# Patient Record
Sex: MALE | Race: WHITE | NOT HISPANIC OR LATINO | Employment: FULL TIME | URBAN - METROPOLITAN AREA
[De-identification: names, ages, dates, MRNs, and addresses within clinical notes are randomized per-mention and may not be internally consistent; named-entity substitution may affect disease eponyms.]

---

## 2018-10-19 ENCOUNTER — HISTORICAL (OUTPATIENT)
Dept: ADMINISTRATIVE | Facility: HOSPITAL | Age: 28
End: 2018-10-19

## 2018-10-19 LAB
ABS NEUT (OLG): 3.5
ALBUMIN SERPL-MCNC: 4.6 GM/DL (ref 3.4–5)
ALBUMIN/GLOB SERPL: 2 {RATIO} (ref 1.5–2.5)
ALP SERPL-CCNC: 54 UNIT/L (ref 38–126)
ALT SERPL-CCNC: 13 UNIT/L (ref 7–52)
AST SERPL-CCNC: 12 UNIT/L (ref 15–37)
BILIRUB SERPL-MCNC: 0.6 MG/DL (ref 0.2–1)
BILIRUBIN DIRECT+TOT PNL SERPL-MCNC: 0.1 MG/DL (ref 0–0.5)
BILIRUBIN DIRECT+TOT PNL SERPL-MCNC: 0.5 MG/DL
BUN SERPL-MCNC: 11 MG/DL (ref 7–18)
CALCIUM SERPL-MCNC: 9 MG/DL (ref 8.5–10)
CHLORIDE SERPL-SCNC: 104 MMOL/L (ref 98–107)
CHOLEST SERPL-MCNC: 127 MG/DL (ref 0–200)
CHOLEST/HDLC SERPL: 4.1 {RATIO}
CO2 SERPL-SCNC: 30 MMOL/L (ref 21–32)
CREAT SERPL-MCNC: 1.04 MG/DL (ref 0.6–1.3)
ERYTHROCYTE [DISTWIDTH] IN BLOOD BY AUTOMATED COUNT: 13.3 % (ref 11.5–17)
GLOBULIN SER-MCNC: 2.2 GM/DL (ref 1.2–3)
GLUCOSE SERPL-MCNC: 85 MG/DL (ref 74–106)
HCT VFR BLD AUTO: 49.6 % (ref 42–52)
HDLC SERPL-MCNC: 31 MG/DL (ref 35–60)
HGB BLD-MCNC: 16 GM/DL (ref 14–18)
LDLC SERPL CALC-MCNC: 82 MG/DL (ref 0–129)
LYMPHOCYTES # BLD AUTO: 2.7 X10(3)/MCL (ref 0.6–3.4)
LYMPHOCYTES NFR BLD AUTO: 39.1 % (ref 13–40)
MCH RBC QN AUTO: 30.1 PG (ref 27–31.2)
MCHC RBC AUTO-ENTMCNC: 32 GM/DL (ref 32–36)
MCV RBC AUTO: 93 FL (ref 80–94)
MONOCYTES # BLD AUTO: 0.7 X10(3)/MCL (ref 0–1.8)
MONOCYTES NFR BLD AUTO: 9.9 % (ref 0.1–24)
NEUTROPHILS NFR BLD AUTO: 51 % (ref 47–80)
PLATELET # BLD AUTO: 202 X10(3)/MCL (ref 130–400)
PMV BLD AUTO: 10 FL
POTASSIUM SERPL-SCNC: 4.3 MMOL/L (ref 3.5–5.1)
PROT SERPL-MCNC: 6.9 GM/DL (ref 6.4–8.2)
RBC # BLD AUTO: 5.32 X10(6)/MCL (ref 4.7–6.1)
SODIUM SERPL-SCNC: 139 MMOL/L (ref 136–145)
TRIGL SERPL-MCNC: 37 MG/DL (ref 30–150)
TSH SERPL-ACNC: 0.6 MIU/ML (ref 0.35–4.94)
VLDLC SERPL CALC-MCNC: 7.4 MG/DL
WBC # SPEC AUTO: 6.9 X10(3)/MCL (ref 4.5–11.5)

## 2021-02-22 ENCOUNTER — HISTORICAL (OUTPATIENT)
Dept: ADMINISTRATIVE | Facility: HOSPITAL | Age: 31
End: 2021-02-22

## 2021-02-22 LAB
ABS NEUT (OLG): 4.1 X10(3)/MCL (ref 2.1–9.2)
ALBUMIN SERPL-MCNC: 4.6 GM/DL (ref 3.4–5)
ALBUMIN/GLOB SERPL: 2.3 {RATIO} (ref 1.5–2.5)
ALP SERPL-CCNC: 54 UNIT/L (ref 38–126)
ALT SERPL-CCNC: 13 UNIT/L (ref 7–52)
AST SERPL-CCNC: 15 UNIT/L (ref 15–37)
BILIRUB SERPL-MCNC: 0.3 MG/DL (ref 0.2–1)
BILIRUBIN DIRECT+TOT PNL SERPL-MCNC: 0.1 MG/DL (ref 0–0.5)
BILIRUBIN DIRECT+TOT PNL SERPL-MCNC: 0.2 MG/DL
BUN SERPL-MCNC: 10 MG/DL (ref 7–18)
CALCIUM SERPL-MCNC: 9 MG/DL (ref 8.5–10.1)
CHLORIDE SERPL-SCNC: 105 MMOL/L (ref 98–107)
CHOLEST SERPL-MCNC: 139 MG/DL (ref 0–200)
CHOLEST/HDLC SERPL: 5.1 {RATIO}
CO2 SERPL-SCNC: 26 MMOL/L (ref 21–32)
CREAT SERPL-MCNC: 1.12 MG/DL (ref 0.6–1.3)
ERYTHROCYTE [DISTWIDTH] IN BLOOD BY AUTOMATED COUNT: 13 % (ref 11.5–17)
GLOBULIN SER-MCNC: 2 GM/DL (ref 1.2–3)
GLUCOSE SERPL-MCNC: 90 MG/DL (ref 74–106)
HCT VFR BLD AUTO: 42.5 % (ref 42–52)
HDLC SERPL-MCNC: 27 MG/DL (ref 35–60)
HGB BLD-MCNC: 14.4 GM/DL (ref 14–18)
LDLC SERPL CALC-MCNC: 93 MG/DL (ref 0–129)
LYMPHOCYTES # BLD AUTO: 3.3 X10(3)/MCL (ref 0.6–3.4)
LYMPHOCYTES NFR BLD AUTO: 41.2 % (ref 13–40)
MCH RBC QN AUTO: 29.7 PG (ref 27–31.2)
MCHC RBC AUTO-ENTMCNC: 34 GM/DL (ref 32–36)
MCV RBC AUTO: 88 FL (ref 80–94)
MONOCYTES # BLD AUTO: 0.6 X10(3)/MCL (ref 0.1–1.3)
MONOCYTES NFR BLD AUTO: 7.1 % (ref 0.1–24)
NEUTROPHILS NFR BLD AUTO: 51.7 % (ref 47–80)
PLATELET # BLD AUTO: 220 X10(3)/MCL (ref 130–400)
PMV BLD AUTO: 10.4 FL (ref 9.4–12.4)
POTASSIUM SERPL-SCNC: 4.2 MMOL/L (ref 3.5–5.1)
PROT SERPL-MCNC: 6.6 GM/DL (ref 6.4–8.2)
RBC # BLD AUTO: 4.85 X10(6)/MCL (ref 4.7–6.1)
SODIUM SERPL-SCNC: 141 MMOL/L (ref 136–145)
TRIGL SERPL-MCNC: 231 MG/DL (ref 30–150)
TSH SERPL-ACNC: 0.69 MIU/ML (ref 0.35–4.94)
VLDLC SERPL CALC-MCNC: 46.2 MG/DL
WBC # SPEC AUTO: 8 X10(3)/MCL (ref 4.5–11.5)

## 2022-04-10 ENCOUNTER — HISTORICAL (OUTPATIENT)
Dept: ADMINISTRATIVE | Facility: HOSPITAL | Age: 32
End: 2022-04-10

## 2022-04-25 VITALS
HEIGHT: 67 IN | DIASTOLIC BLOOD PRESSURE: 76 MMHG | BODY MASS INDEX: 35.47 KG/M2 | WEIGHT: 226 LBS | SYSTOLIC BLOOD PRESSURE: 122 MMHG

## 2022-05-03 NOTE — HISTORICAL [facility] CERNER
This is a historical note converted from [employer]. Formatting and pictures may have been removed.  Please reference [employer] for original formatting and attached multimedia. Chief Complaint  WELLNESS CPX, RECHECK MEDS  History of Present Illness  28 year old WM presents for annual wellness  PMH: ADHD  ?  , 2 kids, Works in [employer]  No Tob, EtOH: rare  No exercise  ?  Denies any complaints  Review of Systems  Constitutional:?no fever, fatigue, weakness  Eye:?no vision loss, eye redness, drainage, or pain  ENMT:?no sore throat, ear pain, sinus pain/congestion, nasal congestion/drainage  Respiratory:?no cough, no wheezing, no shortness of breath  Cardiovascular:?no chest pain, no palpitations, no edema  Gastrointestinal:?no nausea, vomiting, or diarrhea. No abdominal pain  Genitourinary:?no dysuria, no urinary frequency or urgency, no hematuria  Hema/Lymph:?no abnormal bruising or bleeding  Endocrine:?no heat or cold intolerance, no excessive thirst or excessive urination  Musculoskeletal:?no muscle or joint pain, no joint swelling  Integumentary:?no skin rash or abnormal lesion  Neurologic: no headache, no dizziness, no weakness or numbness  ?  Physical Exam  Vitals & Measurements  T:?37.1? ?C (Oral)? HR:?56(Peripheral)? BP:?118/74?  HT:?172?cm? HT:?172?cm? WT:?91.5?kg? WT:?91.5?kg? BMI:?30.93?  General:?well-developed well-nourished in no acute distress  Eye: PERRLA, EOMI, clear conjunctiva, eyelids normal  HENT:?TMs/ear canals clear, oropharynx without erythema/exudate, oropharynx and nasal mucosal surfaces moist, no maxillary/frontal sinus tenderness to palpation  Neck: full range of motion, no thyromegaly or lymphadenopathy  Respiratory:?clear to auscultation bilaterally  Cardiovascular:?regular rate and rhythm without murmurs, gallops or rubs  Gastrointestinal:?soft, non-tender, non-distended with normal bowel sounds, without masses to palpation  Genitourinary: no CVA tenderness to  palpation  Musculoskeletal:?full range of motion of all extremities/spine without limitation or discomfort  Integumentary: no rashes or skin lesions present  Neurologic: cranial nerves intact, no signs of peripheral neurological deficit, motor/sensory function intact  ?  Assessment/Plan  1.?Wellness examination  ?LABS: CBC, CMP, TSH, FLP  ?  2.?Adult ADHD  Continue Vyvanse 50mg PO q day (30, NR)  Continue Adderall?20mg PO q afternoon (30, NR)  ??????? rx x 3  Ordered:  Clinic Follow up, *Est. 01/19/19 3:00:00 CST, Order for future visit, Adult ADHD, [facility]  Office/Outpatient Visit Level 3 Established [id number] PC, Adult ADHD, [facility], 10/19/18 10:25:00 CDT  ?  Orders:  amphetamine-dextroamphetamine, 20 mg = 1 tab(s), Oral, qAM, # 30 tab(s), 0 Refill(s), Earliest Fill Date 11/19/18  amphetamine-dextroamphetamine, 20 mg = 1 tab(s), Oral, qAM, # 30 tab(s), 0 Refill(s)  amphetamine-dextroamphetamine, 20 mg = 1 tab(s), Oral, qAM, # 30 tab(s), 0 Refill(s), Earliest Fill Date 12/19/18  lisdexamfetamine, 50 mg = 1 cap(s), Oral, Daily, # 30 cap(s), 0 Refill(s)  lisdexamfetamine, 50 mg = 1 cap(s), Oral, Daily, # 30 cap(s), 0 Refill(s), Earliest Fill Date 11/19/18  lisdexamfetamine, 50 mg = 1 cap(s), Oral, Daily, # 30 cap(s), 0 Refill(s), Earliest Fill Date 12/19/18   Problem List/Past Medical History  Ongoing  Adult ADHD  Obesity  Tobacco user  Historical  No qualifying data  Procedure/Surgical History  Eustachian tuboplasty  Tonsillectomy and adenoidectomy   Medications  Adderall 20 mg oral tablet, 20 mg= 1 tab(s), Oral, qAM  Adderall 20 mg oral tablet, 20 mg= 1 tab(s), Oral, qAM  Adderall 20 mg oral tablet, 20 mg= 1 tab(s), Oral, qAM  Adderall 20 mg oral tablet, 20 mg= 1 tab(s), Oral, qAM,? ?Investigating  Adderall 20 mg oral tablet, 20 mg= 1 tab(s), Oral, qAM  Adderall 20 mg oral tablet, 20 mg= 1 tab(s), Oral, qAM  Adderall 20 mg oral tablet, 20 mg= 1 tab(s), Oral, qAM  Adderall 20 mg oral tablet, 20 mg= 1 tab(s),  Oral, qAM,? ?Investigating  amphetamine-dextroamphetamine 20 mg oral tablet, 20 mg= 1 tab(s), Oral, Daily  Norco 5 mg-325 mg oral tablet, 1 tab(s), Oral, q6hr, PRN,? ?Investigating  Vyvanse 50 mg oral capsule, 50 mg= 1 cap(s), Oral, Daily  Vyvanse 50 mg oral capsule, 50 mg= 1 cap(s), Oral, Daily  Vyvanse 50 mg oral capsule, 50 mg= 1 cap(s), Oral, Daily,? ?Investigating  Vyvanse 50 mg oral capsule, 50 mg= 1 cap(s), Oral, Daily,? ?Investigating  Vyvanse 50 mg oral capsule, 50 mg= 1 cap(s), Oral, qAM  Vyvanse 50 mg oral capsule, 50 mg= 1 cap(s), Oral, Daily  Vyvanse 50 mg oral capsule, 50 mg= 1 cap(s), Oral, Daily  Vyvanse 50 mg oral capsule, 50 mg= 1 cap(s), Oral, Daily  Allergies  No Known Allergies  Social History  Alcohol - Denies Alcohol Use, 10/03/2012  Current, Beer, 1-2 times per year, 03/19/2018  Employment/School  Employed, 03/19/2018  Home/Environment  Lives with Children, Spouse., 03/19/2018  Nutrition/Health  Regular, 03/19/2018  Substance Abuse  Past, 03/19/2018  Tobacco - Denies Tobacco Use, 10/03/2012  Current every day smoker, Electronic Device, No, 10/19/2018  Family History  Acute myocardial infarction.: Grandfather.  Asthma.: Mother.  Emphysema: Mother.  Hypertension.: Father.  Immunizations  Vaccine Date Status   hepatitis B pediatric vaccine 07/15/2005 Recorded   hepatitis B pediatric vaccine 02/21/2005 Recorded   hepatitis B pediatric vaccine 11/01/2004 Recorded   tetanus-diphtheria toxoids 11/01/2004 Recorded   influenza virus vaccine, inactivated 10/15/2002 Recorded   measles/mumps/rubella virus vaccine 08/04/1993 Recorded   measles/mumps/rubella virus vaccine 09/11/1990 Recorded   Health Maintenance  Health Maintenance  ???Pending?(in the next year)  ??? ??OverDue  ??? ? ? ?Tetanus Vaccine due??11/01/14??and every 10??year(s)  ??? ??Due?  ??? ? ? ?ADL Screening due??10/19/18??and every 1??year(s)  ??? ? ? ?Alcohol Misuse Screening due??10/19/18??and every 1??year(s)  ??? ? ? ?Depression  Screening due??10/19/18??and every?  ??? ? ? ?Influenza Vaccine due??10/19/18??and every?  ??? ??Due In Future?  ??? ? ? ?Smoking Cessation not due until??07/18/19??and every 1??year(s)  ???Satisfied?(in the past 1 year)  ??? ??Satisfied?  ??? ? ? ?Blood Pressure Screening on??10/19/18.??Satisfied by Macie Boyle  ??? ? ? ?Body Mass Index Check on??10/19/18.??Satisfied by Macie Boyle  ??? ? ? ?Influenza Vaccine on??10/19/18.??Satisfied by Macie Boyle  ??? ? ? ?Obesity Screening on??10/19/18.??Satisfied by Macie Boyle  ??? ? ? ?Smoking Cessation on??07/18/18.??Satisfied by Macie Boyle  ?  ?

## 2022-08-06 ENCOUNTER — HOSPITAL ENCOUNTER (EMERGENCY)
Facility: HOSPITAL | Age: 32
Discharge: HOME | End: 2022-08-06
Attending: SPECIALIST
Payer: COMMERCIAL

## 2022-08-06 VITALS
SYSTOLIC BLOOD PRESSURE: 135 MMHG | BODY MASS INDEX: 39.24 KG/M2 | OXYGEN SATURATION: 98 % | DIASTOLIC BLOOD PRESSURE: 84 MMHG | WEIGHT: 250 LBS | HEIGHT: 67 IN | TEMPERATURE: 98 F | RESPIRATION RATE: 16 BRPM | HEART RATE: 79 BPM

## 2022-08-06 DIAGNOSIS — M70.21 OLECRANON BURSITIS OF RIGHT ELBOW: Primary | ICD-10-CM

## 2022-08-06 PROCEDURE — 99283 EMERGENCY DEPT VISIT LOW MDM: CPT

## 2022-08-06 PROCEDURE — 25000003 PHARM REV CODE 250: Performed by: SPECIALIST

## 2022-08-06 RX ORDER — KETOROLAC TROMETHAMINE 10 MG/1
10 TABLET, FILM COATED ORAL
Status: COMPLETED | OUTPATIENT
Start: 2022-08-06 | End: 2022-08-06

## 2022-08-06 RX ORDER — DICLOFENAC SODIUM 50 MG/1
50 TABLET, DELAYED RELEASE ORAL 3 TIMES DAILY PRN
Qty: 20 TABLET | Refills: 0 | Status: SHIPPED | OUTPATIENT
Start: 2022-08-06 | End: 2022-10-12

## 2022-08-06 RX ADMIN — KETOROLAC TROMETHAMINE 10 MG: 10 TABLET, FILM COATED ORAL at 10:08

## 2022-08-07 NOTE — ED PROVIDER NOTES
Encounter Date: 8/6/2022       History     Chief Complaint   Patient presents with    Joint Swelling     Pt complaints of right elbow pain and stiffness that started last night. Denies any previous problems     Right elbow pain since last night; he states his wife said it was swollen but he does not think so; no fever        Review of patient's allergies indicates:  No Known Allergies  No past medical history on file.  Past Surgical History:   Procedure Laterality Date    DILATION, EUSTACHIAN TUBE, USING BALLOON      TONSILLECTOMY AND ADENOIDECTOMY       Family History   Problem Relation Age of Onset    Asthma Mother     Emphysema Mother     Hypertension Father     Heart attack Maternal Grandfather      Social History     Tobacco Use    Smoking status: Smoker, Current Status Unknown   Substance Use Topics    Alcohol use: Not Currently    Drug use: Not Currently     Review of Systems   Constitutional: Negative.    Respiratory: Negative.    Cardiovascular: Negative.    Gastrointestinal: Negative.    Genitourinary: Negative.    Musculoskeletal: Negative.    Neurological: Negative.        Physical Exam     Initial Vitals [08/06/22 2150]   BP Pulse Resp Temp SpO2   135/84 79 16 98.4 °F (36.9 °C) 98 %      MAP       --         Physical Exam    Nursing note and vitals reviewed.  Constitutional: He appears well-developed and well-nourished.   HENT:   Head: Normocephalic and atraumatic.   Eyes: EOM are normal. Pupils are equal, round, and reactive to light.   Neck: Neck supple.   Normal range of motion.  Cardiovascular: Normal rate, regular rhythm and normal heart sounds.   Pulmonary/Chest: Breath sounds normal.   Abdominal: Abdomen is soft. Bowel sounds are normal.   Musculoskeletal:         General: Normal range of motion.      Cervical back: Normal range of motion and neck supple.      Comments: Point tenderness right olecranon, no erythema, no swelling, FROM     Neurological: He is alert and oriented to person,  place, and time.   Skin: Skin is warm and dry.         ED Course   Procedures  Labs Reviewed - No data to display       Imaging Results    None          Medications   ketorolac tablet 10 mg (10 mg Oral Given 8/6/22 2221)                          Clinical Impression:   Final diagnoses:  [M70.21] Olecranon bursitis of right elbow (Primary)          ED Disposition Condition    Discharge Stable        ED Prescriptions     Medication Sig Dispense Start Date End Date Auth. Provider    diclofenac (VOLTAREN) 50 MG EC tablet Take 1 tablet (50 mg total) by mouth 3 (three) times daily as needed (pain). 20 tablet 8/6/2022  Juan Golden MD        Follow-up Information     Follow up With Specialties Details Why Contact Info    Noah Christian MD Family Medicine In 1 week As needed [address] [facility] [address]  [phone]      [facility] Emergency Medicine  As needed [address] [facility] [address] [phone]           Juan Golden MD  08/07/22 0039

## 2022-08-17 PROBLEM — E66.9 OBESITY: Status: ACTIVE | Noted: 2022-08-17

## 2022-08-17 PROBLEM — Z78.9 ELECTRONIC CIGARETTE USE: Status: ACTIVE | Noted: 2022-08-17

## 2022-11-10 PROBLEM — E66.09 CLASS 2 OBESITY DUE TO EXCESS CALORIES WITHOUT SERIOUS COMORBIDITY WITH BODY MASS INDEX (BMI) OF 37.0 TO 37.9 IN ADULT: Status: ACTIVE | Noted: 2022-08-17

## 2022-12-12 PROBLEM — E66.1 CLASS 2 DRUG-INDUCED OBESITY WITH BODY MASS INDEX (BMI) OF 36.0 TO 36.9 IN ADULT: Status: ACTIVE | Noted: 2022-08-17

## 2022-12-12 PROBLEM — E66.09 CLASS 2 OBESITY DUE TO EXCESS CALORIES WITHOUT SERIOUS COMORBIDITY WITH BODY MASS INDEX (BMI) OF 37.0 TO 37.9 IN ADULT: Status: RESOLVED | Noted: 2022-08-17 | Resolved: 2022-12-12

## 2023-01-16 PROBLEM — E66.9 OBESITY (BMI 35.0-39.9 WITHOUT COMORBIDITY): Status: ACTIVE | Noted: 2023-01-16

## 2023-01-16 PROBLEM — E66.1 CLASS 2 DRUG-INDUCED OBESITY WITH BODY MASS INDEX (BMI) OF 36.0 TO 36.9 IN ADULT: Status: RESOLVED | Noted: 2022-08-17 | Resolved: 2023-01-16

## 2023-02-22 ENCOUNTER — HOSPITAL ENCOUNTER (EMERGENCY)
Facility: HOSPITAL | Age: 33
Discharge: HOME | End: 2023-02-22
Attending: STUDENT IN AN ORGANIZED HEALTH CARE EDUCATION/TRAINING PROGRAM
Payer: COMMERCIAL

## 2023-02-22 VITALS
DIASTOLIC BLOOD PRESSURE: 77 MMHG | RESPIRATION RATE: 18 BRPM | OXYGEN SATURATION: 96 % | WEIGHT: 230 LBS | BODY MASS INDEX: 34.86 KG/M2 | SYSTOLIC BLOOD PRESSURE: 127 MMHG | HEART RATE: 110 BPM | HEIGHT: 68 IN | TEMPERATURE: 98 F

## 2023-02-22 DIAGNOSIS — S05.01XA ABRASION OF RIGHT CORNEA, INITIAL ENCOUNTER: Primary | ICD-10-CM

## 2023-02-22 PROCEDURE — 25000003 PHARM REV CODE 250: Performed by: STUDENT IN AN ORGANIZED HEALTH CARE EDUCATION/TRAINING PROGRAM

## 2023-02-22 PROCEDURE — 99283 EMERGENCY DEPT VISIT LOW MDM: CPT

## 2023-02-22 RX ORDER — ERYTHROMYCIN 5 MG/G
OINTMENT OPHTHALMIC ONCE
Status: COMPLETED | OUTPATIENT
Start: 2023-02-22 | End: 2023-02-22

## 2023-02-22 RX ORDER — TETRACAINE HYDROCHLORIDE 5 MG/ML
2 SOLUTION OPHTHALMIC
Status: COMPLETED | OUTPATIENT
Start: 2023-02-22 | End: 2023-02-22

## 2023-02-22 RX ORDER — ERYTHROMYCIN 5 MG/G
OINTMENT OPHTHALMIC
Qty: 3.5 G | Refills: 0 | Status: SHIPPED | OUTPATIENT
Start: 2023-02-22 | End: 2023-02-24

## 2023-02-22 RX ADMIN — FLUORESCEIN SODIUM 1 EACH: 1 STRIP OPHTHALMIC at 04:02

## 2023-02-22 RX ADMIN — TETRACAINE HYDROCHLORIDE 2 DROP: 5 SOLUTION OPHTHALMIC at 03:02

## 2023-02-22 RX ADMIN — ERYTHROMYCIN 1 INCH: 5 OINTMENT OPHTHALMIC at 05:02

## 2023-02-22 NOTE — ED NOTES
Pt w bilat eye redness today - but states feels like something flew into rt eye today- also has been rubbing the eye he states because of the feeling like something is in it.  Reports he did flush it at home after he felt like something flew into it but it feels  worse now.

## 2023-02-22 NOTE — ED NOTES
"Dr leggett int o examine pt rt eye after 2 more gtts alcaine instilled per her for eye pain.  Stained w fluristrip- per dr leggett and  corrales lamp reveals corneal abrasion.  She flipped eyelid and assessed for  foreign body then irrigated pt eye sever times w  ns eye wash .  Pt reports feels like something is in his eye- corneal abrasion dx and s/s expalined to him per dr leggett.  Pt stated-  " I still feel like something is in my eye, I'm just gonna take a qtip when  I get home and  stick it in there and  check it myself"  pt advised  by dr leggett not to do  this- explained risks .   Educated again and  advised to see eye md tomorrow fro recheck and get rx eye ointment and apply as ordered.  "

## 2023-02-24 PROBLEM — E66.9 OBESITY (BMI 35.0-39.9 WITHOUT COMORBIDITY): Status: RESOLVED | Noted: 2023-01-16 | Resolved: 2023-02-24

## 2023-02-25 NOTE — ED PROVIDER NOTES
Encounter Date: 2/22/2023       History     Chief Complaint   Patient presents with    Foreign Body in Eye     Pt states he was working on something outside when something flew into right eye; happened 3hrs ago and pt states it has progressively gotten worse; vision is blurry and painful to keep eye open      32 M presents to ER, Pt states he was working on something outside when something flew into right eye; happened 3hrs ago and pt states it has progressively gotten worse; vision is blurry and painful to keep eye open       Review of patient's allergies indicates:  No Known Allergies  No past medical history on file.  Past Surgical History:   Procedure Laterality Date    DILATION, EUSTACHIAN TUBE, USING BALLOON      TONSILLECTOMY AND ADENOIDECTOMY       Family History   Problem Relation Age of Onset    Asthma Mother     Emphysema Mother     Hypertension Father     Heart attack Maternal Grandfather      Social History     Tobacco Use    Smoking status: Smoker, Current Status Unknown     Types: Vaping with nicotine    Smokeless tobacco: Never   Substance Use Topics    Alcohol use: Not Currently    Drug use: Not Currently     Review of Systems   Constitutional:  Negative for fever.   HENT:  Negative for sore throat.    Eyes:  Positive for photophobia, pain, redness and visual disturbance.   Respiratory:  Negative for shortness of breath.    Cardiovascular:  Negative for chest pain.   Gastrointestinal:  Negative for nausea.   Genitourinary:  Negative for dysuria.   Musculoskeletal:  Negative for back pain.   Skin:  Negative for rash.   Neurological:  Negative for weakness.   Hematological:  Does not bruise/bleed easily.     Physical Exam     Initial Vitals [02/22/23 1513]   BP Pulse Resp Temp SpO2   127/77 110 18 98.3 °F (36.8 °C) 96 %      MAP       --         Physical Exam    Constitutional: He appears well-developed and well-nourished.   HENT:   Head: Normocephalic.   Eyes: EOM are normal. Pupils are equal, round,  and reactive to light. Lids are everted and swept, no foreign bodies found. Right eye exhibits no chemosis, no discharge and no exudate. No foreign body present in the right eye. Right conjunctiva is injected.   Slit lamp exam:       The right eye shows corneal abrasion and fluorescein uptake.   Neck:   Normal range of motion.  Cardiovascular:  Normal rate, regular rhythm and normal pulses.           Pulmonary/Chest: Breath sounds normal. No respiratory distress.   Abdominal: Abdomen is soft. Bowel sounds are normal. There is no abdominal tenderness.   Musculoskeletal:         General: Normal range of motion.      Cervical back: Normal range of motion.     Neurological: He is alert.   Skin: Skin is warm. Capillary refill takes less than 2 seconds.   Psychiatric: He has a normal mood and affect.       ED Course   Procedures  Labs Reviewed - No data to display       Imaging Results    None          Medications   TETRAcaine HCl (PF) 0.5 % Drop 2 drop (2 drops Left Eye Given 23 1520)   fluorescein ophthalmic strip 1 each (1 each Left Eye Given 23 1615)   erythromycin 5 mg/gram (0.5 %) ophthalmic ointment (1 inch Right Eye Given 23 1722)     Medical Decision Making:   Differential Diagnosis:   Corneal abrasion, foreign body, other ocular pathology  ED Management:  Corneal abrasion visualized fluorescein and Wood's lamp on exam.    Erythromycin ointment applied and prescribed, patient given info to follow with Ophthalmology on tomorrow.  May take ibuprofen p.r.n. pain.  Ear precautions reviewed, all questions concerns addressed to the patient's satisfaction.                        Clinical Impression:   Final diagnoses:  [S05.01XA] Abrasion of right cornea, initial encounter (Primary)        ED Disposition Condition    Discharge Stable          ED Prescriptions       Medication Sig Dispense Start Date End Date Auth. Provider    erythromycin (ROMYCIN) ophthalmic ointment () Place a 1/2 inch ribbon of  ointment into the lower eyelid  QID 3.5 g 2/22/2023 2/24/2023 Sandy Pascual MD          Follow-up Information       Follow up With Specialties Details Why Contact Info    Pj Ballard, [facility] Op[facility] Schedule an appointment as soon as possible for a visit on 2/23/2023  [address] [facility] [address]  [phone]               Sandy Pascual MD  02/25/23 0806

## 2023-07-23 ENCOUNTER — HOSPITAL ENCOUNTER (EMERGENCY)
Facility: HOSPITAL | Age: 33
Discharge: HOME | End: 2023-07-23
Attending: SPECIALIST
Payer: COMMERCIAL

## 2023-07-23 VITALS
HEART RATE: 96 BPM | RESPIRATION RATE: 20 BRPM | DIASTOLIC BLOOD PRESSURE: 72 MMHG | SYSTOLIC BLOOD PRESSURE: 136 MMHG | OXYGEN SATURATION: 99 % | TEMPERATURE: 98 F

## 2023-07-23 DIAGNOSIS — M25.562 LEFT KNEE PAIN: ICD-10-CM

## 2023-07-23 DIAGNOSIS — S93.402A SPRAIN OF LEFT ANKLE, UNSPECIFIED LIGAMENT, INITIAL ENCOUNTER: ICD-10-CM

## 2023-07-23 DIAGNOSIS — M25.572 LEFT ANKLE PAIN: ICD-10-CM

## 2023-07-23 DIAGNOSIS — S83.92XA SPRAIN OF LEFT KNEE, UNSPECIFIED LIGAMENT, INITIAL ENCOUNTER: Primary | ICD-10-CM

## 2023-07-23 PROCEDURE — 99284 EMERGENCY DEPT VISIT MOD MDM: CPT

## 2023-07-23 PROCEDURE — 96372 THER/PROPH/DIAG INJ SC/IM: CPT | Performed by: SPECIALIST

## 2023-07-23 PROCEDURE — 63600175 PHARM REV CODE 636 W HCPCS: Performed by: SPECIALIST

## 2023-07-23 RX ORDER — DICLOFENAC SODIUM 50 MG/1
50 TABLET, DELAYED RELEASE ORAL 3 TIMES DAILY PRN
Qty: 30 TABLET | Refills: 0 | Status: SHIPPED | OUTPATIENT
Start: 2023-07-23 | End: 2024-02-28

## 2023-07-23 RX ORDER — KETOROLAC TROMETHAMINE 30 MG/ML
60 INJECTION, SOLUTION INTRAMUSCULAR; INTRAVENOUS
Status: COMPLETED | OUTPATIENT
Start: 2023-07-23 | End: 2023-07-23

## 2023-07-23 RX ADMIN — KETOROLAC TROMETHAMINE 60 MG: 60 INJECTION, SOLUTION INTRAMUSCULAR at 07:07
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
## 2024-02-28 PROBLEM — F90.0 ATTENTION DEFICIT HYPERACTIVITY DISORDER (ADHD), PREDOMINANTLY INATTENTIVE TYPE: Status: ACTIVE | Noted: 2024-02-28

## 2024-02-28 PROBLEM — F90.0 ATTENTION DEFICIT HYPERACTIVITY DISORDER (ADHD), PREDOMINANTLY INATTENTIVE TYPE: Chronic | Status: ACTIVE | Noted: 2024-02-28
